# Patient Record
Sex: FEMALE | Race: WHITE | NOT HISPANIC OR LATINO | Employment: FULL TIME | URBAN - NONMETROPOLITAN AREA
[De-identification: names, ages, dates, MRNs, and addresses within clinical notes are randomized per-mention and may not be internally consistent; named-entity substitution may affect disease eponyms.]

---

## 2022-07-05 ENCOUNTER — OFFICE VISIT (OUTPATIENT)
Dept: FAMILY MEDICINE CLINIC | Facility: CLINIC | Age: 32
End: 2022-07-05

## 2022-07-05 VITALS
WEIGHT: 126.3 LBS | SYSTOLIC BLOOD PRESSURE: 120 MMHG | HEART RATE: 98 BPM | DIASTOLIC BLOOD PRESSURE: 78 MMHG | HEIGHT: 64 IN | RESPIRATION RATE: 15 BRPM | BODY MASS INDEX: 21.56 KG/M2 | TEMPERATURE: 98.2 F | OXYGEN SATURATION: 99 %

## 2022-07-05 DIAGNOSIS — N64.4 BREAST PAIN, LEFT: Primary | ICD-10-CM

## 2022-07-05 PROCEDURE — 99213 OFFICE O/P EST LOW 20 MIN: CPT | Performed by: PHYSICIAN ASSISTANT

## 2022-07-05 NOTE — PROGRESS NOTES
"      Patient Office Visit      Patient Name: Deya Alberto  : 1990   MRN: [id number]     Chief Complaint:    Chief Complaint   Patient presents with   • Breast Pain       History of Present Illness: Deya Alberto is a 32 y.o. female who is here today for left breast pain that started about a week ago.  She is left-handed and did a lot of work moving recently and thinks this is the cause but wanted to have checked out.  She says this is improving since she has stopped all of the work she was doing getting moved.  She denies any family history of breast cancer.    Subjective      Review of Systems:   Review of Systems   Skin:        Left breast tenderness.        Past Medical History: History reviewed. No pertinent past medical history.    Past Surgical History: History reviewed. No pertinent surgical history.    Family History: History reviewed. No pertinent family history.    Social History:   Social History     Socioeconomic History   • Marital status:    Tobacco Use   • Smoking status: Never Smoker   • Smokeless tobacco: Never Used   Vaping Use   • Vaping Use: Never used   Substance and Sexual Activity   • Alcohol use: Defer   • Drug use: Defer   • Sexual activity: Defer       Allergies:   No Known Allergies    Objective     Physical Exam:  Vital Signs:   Vitals:    22 1451   BP: 120/78   BP Location: Right arm   Patient Position: Sitting   Cuff Size: Adult   Pulse: 98   Resp: 15   Temp: 98.2 °F (36.8 °C)   TempSrc: Temporal   SpO2: 99%   Weight: 57.3 kg (126 lb 4.8 oz)   Height: 162.6 cm (64\")     Body mass index is 21.68 kg/m².   BMI is within normal parameters. No other follow-up for BMI required.       Physical Exam  Chest:          Comments: Tender fibrocystic changes left lateral breast area.  Both breast have moderate fibrocystic changes.        Procedures    Assessment / Plan      Assessment/Plan:   Diagnoses and all orders for this visit:    1. Breast pain, left (Primary)    Most " likely due to fibrocystic breast tissue.  I doubt this is related to her recent activity.  I cannot for sure rule out anything concerning without doing some type of imaging such as breast ultrasound.  She would like to wait and see if this resolves because of the cost.  I did tell her that if she changes her mind she could message me and I will go ahead and order this and get it arranged.  She should definitely follow-up if this persist.    Medications:   No current outpatient medications on file.        Follow Up:   No follow-ups on file.    Evonne Kellogg PA-C   [facility]